# Patient Record
Sex: FEMALE | Race: AMERICAN INDIAN OR ALASKA NATIVE
[De-identification: names, ages, dates, MRNs, and addresses within clinical notes are randomized per-mention and may not be internally consistent; named-entity substitution may affect disease eponyms.]

---

## 2017-04-19 NOTE — CR
DATE OF SERVICE:  04/18/17

CLINICAL DATA:  abdominal pain



SUPINE AND UPRIGHT ABDOMEN:  



There is a moderate amount of gas and stool present throughout the colon.  



No evidence of obstruction or ileus.  No free air. 



There is a 2 mm calcification in the left pelvis which is most likely vascular.
  Distal ureteral calculi should be considered.



The exam is otherwise negative.



[id number]
[facility]

## 2017-04-19 NOTE — ER
HISTORY OF PRESENT ILLLNESS:  The patient presents to the emergency room by 
ambulance.  The

patient is the historian.  Her chief complaint is epigastric pain that developed

approximately 4 hours prior to arrival.  She did have some dry heaves.  In the 
ambulance,

she was given morphine for abdominal discomfort and Zofran for nausea.  The 
patient states

that she has had a history of gallstone in the past.  She has no history of 
peptic ulcer

disease.  She states that she is a smoker, does not do any street drugs, or 
drink alcohol to

any significant degree.  She has not had any alcohol recently.  She gets her 
medical care in

[place] where they are treating her for hypothyroidism and depression.  She is 
on Synthroid

125 mg p.o. daily, Zoloft 50 mg daily, and Remeron 30 mg p.o. daily.

 

REVIEW OF SYSTEMS:

The patient denies headache, fever, chills, cough, wheeze, shortness of breath, 
chest pain,

palpitations, CVA pain, dysuria, joint inflammation, limitations of joint 
motions, skin

rashes, or neurologic complaints.  She does think she could be pregnant.  She 
does admit to

having some constipation.

 

OBJECTIVE:  GENERAL:  A well-developed, well-nourished female.  She is alert 
and oriented

x3.  She appears to be in some mild distress due to epigastric discomfort.  She 
has no

heaves, no dry heaves.  VITAL SIGNS:  Upon admission, show temperature 99.3, 
blood pressure

123/73, pulse is 56, respirations 18, and she is 100% saturated on room air.  
SKIN:

Anicteric.  It is dry with good turgor.  There is no rash.  EYES:  EOMI, PERRLA
, and

anicteric.  ENT:  Throat is clear and moist.  HEART:  Regular rate and rhythm.  
No murmurs,

rubs, or gallops.  No S3, no S4.  LUNGS:  Clear.  BACK:  No CVA or cord 
tenderness.

ABDOMEN:  Soft.  Bowel sounds x4.  No organomegaly.  No rebound.  No discomfort 
except for

epigastric pain, but this seems to be somewhat mild in nature.  NEUROLOGIC:  
Normal.

EXTREMITIES:  No clubbing, cyanosis, or edema.

 

Labs were performed.  EKG from the ambulance showed normal sinus rhythm, no 
evidence of

acute ischemia.  CBC was normal without a shift.  CMP was normal.  UA was 
normal.  Amylase

and lipase were normal.

 

In the emergency room, the patient was given Zantac 50 mg IV and a GI cocktail 
and she did

have some relief with this, but was not totally pain free.  A flat and upright 
was ordered

after her pregnancy test came back negative, the film showed only stool to

be present.  No evidence of perforation, no air/fluid levels and, no gallstones 
noted on her x-ray.

 

ASSESSMENT:  Epigastric pain.

 

PLAN:  The patient will follow up with her physician in the morning.  She was 
given

hydrocodone 5/APAP 325 one p.o. q.4 hours p.r.n., #3, were dispensed.  A 
prescription for

omeprazole 20 mg 1 p.o. daily was written and she can also use over-the-counter 
Zantac

p.r.n. as directed on the label.  The patient will return if she gets worsening 
pain, fever,

vomiting.  The patient was discharged to home, picked up by family members.

 

DIAGNOSES:

1. Epigastric pain.

2. Gastritis.

 

 

KAREN/GONZALO

DD:  04/19/2017 01:05:09

DT:  04/19/2017 04:17:41

Job #:  [id number]

BRAN

## 2017-07-22 NOTE — EDM.PDOC
ED HPI GENERAL MEDICAL PROBLEM





- General


Stated Complaint: Epigastric pain


Time Seen by Provider: 07/22/17 11:05


Source of Information: Reports: Patient


History Limitations: Reports: No Limitations





- History of Present Illness


INITIAL COMMENTS - FREE TEXT/NARRATIVE: 





Patient is a 42 year old woman with a history of abdominal pain caused both by 

gallstones and possibly a stomach ulcer.  She was supposed to get her 

gallbladder removed but she has not done that so far.  She drank two large 

doses of Ny Quil last night and this morning she has had worsening abdominal 

pain to the point she called for the ambulance to come get her and help her 

with the pain.  It feels more like when she was told she possibly had 

gastritis.  No fever or chills or other complaints, no nausea, vomiting or 

diarrhea and no blood in the stool. 


Onset: Today, Sudden


Onset Date: 07/22/17


Onset Time: 09:00


Duration: Hour(s): (2)


Location: Reports: Abdomen (epigastric region)


Quality: Reports: Ache, Burning, Same as Previous Episode, Sharp


Severity: Severe


Improves with: Reports: None


Worsens with: Reports: None


Context: Reports: Other (History of cholelithiasis and gastritis.)


Associated Symptoms: Reports: No Other Symptoms


  ** Abdominal


Pain Score (Numeric/FACES): 9





- Related Data


 Allergies











Allergy/AdvReac Type Severity Reaction Status Date / Time


 


No Known Allergies Allergy   Verified 04/18/17 22:41











Home Meds: 


 Home Meds





Levothyroxine Sodium [Synthroid] 125 mg PO DAILY 12/20/13 [History]


Mirtazapine 30 mg PO DAILY 02/07/15 [History]


Sertraline [Zoloft] 50 mg PO DAILY 02/07/15 [History]











Past Medical History


HEENT History: Reports: Impaired Vision


Respiratory History: Reports: Pneumonia, Recurrent


Gastrointestinal History: Reports: Chronic Constipation, Other (See Below)


Other Gastrointestinal History: hx gall stones


Genitourinary History: Reports: Other (See Below)


Other Genitourinary History: kidney stone Hx


OB/GYN History: Reports: Pregnancy


Musculoskeletal History: Reports: Arthritis, Other (See Below)


Other Musculoskeletal History: hx broken colar bone, arthritis in knees


Neurological History: Reports: Brain Injury, Concussion


Psychiatric History: Reports: Anxiety, Depression


Endocrine/Metabolic History: Reports: Hyperthyroidism





Social & Family History





- Family History


Family Medical History: Noncontributory





- Tobacco Use


Smoking Status *Q: Current Every Day Smoker


Years of Tobacco use: 25


Packs/Tins Daily: 1


Used Tobacco, but Quit: No


Second Hand Smoke Exposure: Yes





- Caffeine Use


Caffeine Use: Reports: Coffee, Soda, Tea





- Alcohol Use


Days Per Week of Alcohol Use: 0





- Recreational Drug Use


Recreational Drug Use: Yes


Drug Use in Last 12 Months: Yes


Recreational Drug Type: Reports: Marijuana/Hashish, Methamphetamine


Recreational Drug Use Frequency: Socially





ED ROS GENERAL





- Review of Systems


Review Of Systems: See Below


Constitutional: Reports: Decreased Appetite


HEENT: Reports: No Symptoms


Respiratory: Reports: No Symptoms


Cardiovascular: Reports: No Symptoms


Endocrine: Reports: No Symptoms


GI/Abdominal: Reports: Abdominal Pain (Epigastric region as per HPI.)


: Reports: No Symptoms


Musculoskeletal: Reports: No Symptoms


Skin: Reports: No Symptoms


Neurological: Reports: No Symptoms


Psychiatric: Reports: No Symptoms


Hematologic/Lymphatic: Reports: No Symptoms


Immunologic: Reports: No Symptoms





ED EXAM, GI/ABD





- Physical Exam


Exam: See Below


Exam Limited By: No Limitations


General Appearance: Alert, WD/WN, No Apparent Distress


Eyes: Bilateral: Normal Appearance, EOMI


Ears: Normal External Exam, Normal Canal, Hearing Grossly Normal, Normal TMs


Nose: Normal Inspection, Normal Mucosa, No Blood


Throat/Mouth: Normal Inspection, Normal Lips, Normal Teeth, Normal Gums, Normal 

Oropharynx, Normal Voice, No Airway Compromise


Head: Atraumatic, Normocephalic


Neck: Normal Inspection, Supple, Non-Tender, Full Range of Motion


Respiratory/Chest: No Respiratory Distress, Lungs Clear, Normal Breath Sounds, 

No Accessory Muscle Use, Chest Non-Tender


Cardiovascular: Normal Peripheral Pulses, Regular Rate, Rhythm, No Edema, No 

Gallop, No JVD, No Murmur, No Rub


GI/Abdominal Exam: Soft, No Organomegaly, No Distention, No Abnormal Bruit, No 

Mass, Tender (Minimal pain on palpation of the epigastric region.  Pain 

described was not commensurate with exam.)


Back Exam: Normal Inspection, Full Range of Motion, NT


Extremities: Normal Inspection, Normal Range of Motion, Non-Tender, Normal 

Capillary Refill, No Pedal Edema


Neurological: Alert, Oriented, CN II-XII Intact, Normal Cognition, Normal Gait, 

Normal Reflexes, No Motor/Sensory Deficits


Psychiatric: Normal Affect, Normal Mood


Skin Exam: Warm, Dry, Intact, Normal Color, No Rash


Lymphatic: No Adenopathy





Course





- Vital Signs


Text/Narrative:: 





Uneventful ED course.  Once she got a liter of fluid, a GI cocktail and 1 mg of 

Dilaudid, her pain had gone from 9/10 to 3/10 and she wanted to go  home on 

some tramadol. She will take Omeprazole 20 mg po q day and Tramadol 50 mg po q 

4 hours and she will follow up with her general physician next week if pain 

returns or worsens.


Last Recorded V/S: 





 Last Vital Signs











Temp  36.7 C   07/22/17 11:50


 


Pulse  68   07/22/17 11:50


 


Resp  16   07/22/17 11:50


 


BP  108/68   07/22/17 11:50


 


Pulse Ox  99   07/22/17 11:50














- Orders/Labs/Meds


Orders: 





 Active Orders 24 hr











 Category Date Time Status


 


 EKG Documentation Completion [RC] ASDIRECTED Care  07/22/17 11:21 Active


 


 Abdomen Pelvis w Cont [CT] Stat Exams  07/22/17 11:21 Taken











Labs: 





 Laboratory Tests











  07/22/17 07/22/17 07/22/17 Range/Units





  10:45 11:40 11:40 


 


WBC   10.1   (4.0-11.0)  K/uL


 


RBC   3.71 L   (3.80-5.80)  M/uL


 


Hgb   10.6 L   (11.5-16.5)  g/dL


 


Hct   32.4 L   (37.0-47.0)  %


 


MCV   87   (76-96)  fL


 


MCH   28.6   (27.0-32.0)  pg


 


MCHC   32.7   (31.0-35.0)  g/dL


 


RDW   15.1   (11.0-16.0)  %


 


Plt Count   366   (150-500)  K/uL


 


MPV   9.3   (6.0-10.0)  fL


 


Neut % (Auto)   73.1 H   (45.0-70.0)  %


 


Lymph % (Auto)   18.8 L   (20.0-40.0)  %


 


Mono % (Auto)   6.6   (3.0-10.0)  %


 


Eos % (Auto)   1.1   (1.0-5.0)  %


 


Baso % (Auto)   0.4   (0.0-0.5)  %


 


Neut # (Auto)   7.39   (2.00-7.50)  K/uL


 


Lymph # (Auto)   1.90   (1.50-4.00)  K/uL


 


Mono # (Auto)   0.67   (0.20-0.80)  K/uL


 


Eos # (Auto)   0.11   (0.04-0.40)  K/uL


 


Baso # (Auto)   0.04   (0.02-0.10)  K/uL


 


Sodium    144  (136-145)  mmol/L


 


Potassium    4.0  (3.5-5.1)  mmol/L


 


Chloride    110 H  ()  mmol/L


 


Carbon Dioxide    24.2  (21.0-32.0)  mmol/L


 


Anion Gap    13.8  (5.0-15.0)  mmol/L


 


BUN    10  (8-26)  mg/dL


 


Creatinine    0.78  (0.55-1.02)  mg/dL


 


Est Cr Clr Drug Dosing    66.80  mL/min


 


Estimated GFR (MDRD)    > 60  (>60)  MLS/MIN


 


BUN/Creatinine Ratio    12.8  (6-25)  


 


Glucose    87  ()  mg/dL


 


Calcium    8.7  (8.5-10.1)  mg/dL


 


Total Bilirubin    0.3  (0.0-1.0)  mg/dL


 


AST    32  (15-37)  U/L


 


ALT    19  (12-78)  U/L


 


Alkaline Phosphatase    86  ()  U/L


 


Troponin I     (0.000-0.060)  ng/mL


 


Total Protein    7.4  (6.4-8.2)  g/dL


 


Albumin    3.6  (3.4-5.0)  g/dL


 


Globulin    3.8  (2.2-4.2)  g/dL


 


Albumin/Globulin Ratio    0.9  (0.8-2.0)  


 


HCG, Qual  Negative    (NEGATIVE)  


 


Urine Color     


 


Urine Appearance     (CLEAR)  


 


Urine pH     (5.0-8.0)  


 


Ur Specific Gravity     (1.003-1.030)  


 


Urine Protein     (NEGATIVE)  mg/dL


 


Urine Glucose (UA)     (NEGATIVE)  mg/dL


 


Urine Ketones     (NEGATIVE)  mg/dL


 


Urine Occult Blood     (NEGATIVE)  


 


Urine Nitrite     (NEGATIVE)  


 


Urine Bilirubin     (NEGATIVE)  


 


Urine Urobilinogen     (0.2-1.0)  E.U./dL


 


Ur Leukocyte Esterase     (NEGATIVE)  


 


Urine RBC     /HPF


 


Urine WBC     /HPF


 


Ur Squamous Epith Cells     /HPF


 


Urine Opiates Screen     (NEGATIVE)  


 


Ur Oxycodone Screen     (NEGATIVE)  


 


Urine Methadone Screen     (NEGATIVE)  


 


U Acetaminophen Screen     (NEGATIVE)  


 


Ur Barbiturates Screen     (NEGATIVE)  


 


Ur Tricyclics Screen     (NEGATIVE)  


 


Ur Phencyclidine Scrn     (NEGATIVE)  


 


Ur Amphetamine Screen     (NEGATIVE)  


 


U Methamphetamines Scrn     (NEGATIVE)  


 


U Benzodiazepines Scrn     (NEGATIVE)  


 


U Cocaine Metab Screen     (NEGATIVE)  


 


U Marijuana (THC) Screen     (NEGATIVE)  














  07/22/17 07/22/17 07/22/17 Range/Units





  11:40 13:04 13:04 


 


WBC     (4.0-11.0)  K/uL


 


RBC     (3.80-5.80)  M/uL


 


Hgb     (11.5-16.5)  g/dL


 


Hct     (37.0-47.0)  %


 


MCV     (76-96)  fL


 


MCH     (27.0-32.0)  pg


 


MCHC     (31.0-35.0)  g/dL


 


RDW     (11.0-16.0)  %


 


Plt Count     (150-500)  K/uL


 


MPV     (6.0-10.0)  fL


 


Neut % (Auto)     (45.0-70.0)  %


 


Lymph % (Auto)     (20.0-40.0)  %


 


Mono % (Auto)     (3.0-10.0)  %


 


Eos % (Auto)     (1.0-5.0)  %


 


Baso % (Auto)     (0.0-0.5)  %


 


Neut # (Auto)     (2.00-7.50)  K/uL


 


Lymph # (Auto)     (1.50-4.00)  K/uL


 


Mono # (Auto)     (0.20-0.80)  K/uL


 


Eos # (Auto)     (0.04-0.40)  K/uL


 


Baso # (Auto)     (0.02-0.10)  K/uL


 


Sodium     (136-145)  mmol/L


 


Potassium     (3.5-5.1)  mmol/L


 


Chloride     ()  mmol/L


 


Carbon Dioxide     (21.0-32.0)  mmol/L


 


Anion Gap     (5.0-15.0)  mmol/L


 


BUN     (8-26)  mg/dL


 


Creatinine     (0.55-1.02)  mg/dL


 


Est Cr Clr Drug Dosing     mL/min


 


Estimated GFR (MDRD)     (>60)  MLS/MIN


 


BUN/Creatinine Ratio     (6-25)  


 


Glucose     ()  mg/dL


 


Calcium     (8.5-10.1)  mg/dL


 


Total Bilirubin     (0.0-1.0)  mg/dL


 


AST     (15-37)  U/L


 


ALT     (12-78)  U/L


 


Alkaline Phosphatase     ()  U/L


 


Troponin I  < 0.017    (0.000-0.060)  ng/mL


 


Total Protein     (6.4-8.2)  g/dL


 


Albumin     (3.4-5.0)  g/dL


 


Globulin     (2.2-4.2)  g/dL


 


Albumin/Globulin Ratio     (0.8-2.0)  


 


HCG, Qual     (NEGATIVE)  


 


Urine Color    Yellow  


 


Urine Appearance    Clear  (CLEAR)  


 


Urine pH    7.0  (5.0-8.0)  


 


Ur Specific Gravity    1.015  (1.003-1.030)  


 


Urine Protein    Negative  (NEGATIVE)  mg/dL


 


Urine Glucose (UA)    Negative  (NEGATIVE)  mg/dL


 


Urine Ketones    Negative  (NEGATIVE)  mg/dL


 


Urine Occult Blood    Trace-lysed H  (NEGATIVE)  


 


Urine Nitrite    Negative  (NEGATIVE)  


 


Urine Bilirubin    Negative  (NEGATIVE)  


 


Urine Urobilinogen    0.2  (0.2-1.0)  E.U./dL


 


Ur Leukocyte Esterase    Negative  (NEGATIVE)  


 


Urine RBC    5-10 H  /HPF


 


Urine WBC    Not seen  /HPF


 


Ur Squamous Epith Cells    Few  /HPF


 


Urine Opiates Screen   Negative   (NEGATIVE)  


 


Ur Oxycodone Screen   Negative   (NEGATIVE)  


 


Urine Methadone Screen   Negative   (NEGATIVE)  


 


U Acetaminophen Screen   Positive H   (NEGATIVE)  


 


Ur Barbiturates Screen   Positive H   (NEGATIVE)  


 


Ur Tricyclics Screen   Negative   (NEGATIVE)  


 


Ur Phencyclidine Scrn   Negative   (NEGATIVE)  


 


Ur Amphetamine Screen   Negative   (NEGATIVE)  


 


U Methamphetamines Scrn   Negative   (NEGATIVE)  


 


U Benzodiazepines Scrn   Negative   (NEGATIVE)  


 


U Cocaine Metab Screen   Negative   (NEGATIVE)  


 


U Marijuana (THC) Screen   Positive H   (NEGATIVE)  











Meds: 





Medications














Discontinued Medications














Generic Name Dose Route Start Last Admin





  Trade Name Freq  PRN Reason Stop Dose Admin


 


Al Hydroxide/Mg Hydroxide  30 ml  07/22/17 12:59  07/22/17 13:07





  Gi Cocktail  PO  07/22/17 13:00  30 ml





  ONETIME ONE   Administration


 


Hydromorphone HCl  0.5 mg  07/22/17 11:25  07/22/17 11:25





  Dilaudid  IVPUSH  07/22/17 11:26  0.5 mg





  ONETIME ONE   Administration


 


Hydromorphone HCl  Confirm  07/22/17 11:31  07/22/17 13:07





  Dilaudid  Administered  07/22/17 11:32  Not Given





  Dose   





  2 mg   





  .ROUTE   





  .STK-MED ONE   


 


Hydromorphone HCl  0.5 mg  07/22/17 13:45  07/22/17 13:45





  Dilaudid  IV  07/22/17 13:46  0.5 mg





  ONETIME ONE   Administration


 


Sodium Chloride  1,000 ml  07/22/17 11:30  07/22/17 11:30





  Sodium Chloride 0.9%  IV   1,000 ml





  ASDIRECTED YONAS   Administration














Departure





- Departure


Time of Disposition: 14:48


Disposition: Home, Self-Care 01


Clinical Impression: 


 Epigastric pain, Cholelithiases, Gastritis, Gastritis








- Discharge Information


Instructions:  Gastritis, Adult, Easy-to-Read


Referrals: 


PCP,None [Primary Care Provider] - 


Care Plan Goals: 


Take omprazole as directed. May take pain medication as needed for pain. Return 

to hospital or clinic if symptoms worsen or persist.





- My Orders


Last 24 Hours: 





My Active Orders





07/22/17 11:21


EKG Documentation Completion [RC] ASDIRECTED 


Abdomen Pelvis w Cont [CT] Stat 














- Assessment/Plan


Last 24 Hours: 





My Active Orders





07/22/17 11:21


EKG Documentation Completion [RC] ASDIRECTED 


Abdomen Pelvis w Cont [CT] Stat

## 2017-07-23 NOTE — CT
DATE OF SERVICE:  7/22/17

CLINICAL DATA:  Abdominal pain



ENHANCED ABDOMEN AND PELVIS CT



Multislice acquisition through the abdomen and pelvis with IV, but without oral 
contrast, was performed. 



Comparison is made to a prior exam dated 12/20/13.



There is a linear density in the left lung base consistent with linear 
atelectasis or fibrosis. The lung bases are otherwise clear. 



There is diffuse fatty infiltration of the liver. No focal hepatic lesions. No 
gallstones. No definite evidence for cholecystitis. There is mild intrahepatic 
biliary duct dilatation. The common bile duct is also mildly dilated measuring 
9 mm. A distal obstructing lesion cannot be excluded. 



The spleen appears normal. The pancreas appears normal. The right and left 
adrenals appear normal. 



The right and left kidneys appear normal. They enhance symmetrically. No 
hydronephrosis or hydroureter. 



The bladder is partially fluid filled; it appears normal.



The appendix is not dilated. No evidence of appendicitis. 



There is fluid within the endometrial cavity of the uterus. There are low-
density lesions in both ovaries consistent with bilateral ovarian cysts. The 
largest is within the left ovary and measures 2.3 cm in diameter. 



There is a very small amount of free fluid noted within the pelvis. 



No free air. No dilated loops of bowel. No adenopathy.  No aortic aneurysm or 
dissection. 



There is a small umbilical hernia containing fat. 



There is apparent diffuse gastric wall thickening most likely related to 
nondistention. Gastritis should be considered.



IMPRESSIONS

1.  Diffuse fatty infiltration of the liver. 

2.  Mild intrahepatic biliary duct dilatation. The common bile duct is dilated 
measuring 9 mm. A distal obstructing process should be considered. Endoscopic 
retrograde cholangiopancreatogram should be considered. 

3.  Apparent diffuse gastric wall thickening. This is probably related to 
nondistention. Gastritis should be considered. 

4.  Bilateral ovarian cysts. The largest is on the left and measures 2.3 cm. 
Followup ultrasound is recommended to confirm resolution. 



[id number]
[facility]

## 2018-07-05 ENCOUNTER — HOSPITAL ENCOUNTER (EMERGENCY)
Dept: HOSPITAL 60 - LB.ED | Age: 43
Discharge: HOME | End: 2018-07-05
Payer: MEDICAID

## 2018-07-05 VITALS — DIASTOLIC BLOOD PRESSURE: 59 MMHG | SYSTOLIC BLOOD PRESSURE: 101 MMHG

## 2018-07-05 DIAGNOSIS — L03.116: ICD-10-CM

## 2018-07-05 DIAGNOSIS — K82.9: Primary | ICD-10-CM

## 2018-07-05 PROCEDURE — 82150 ASSAY OF AMYLASE: CPT

## 2018-07-05 PROCEDURE — 81001 URINALYSIS AUTO W/SCOPE: CPT

## 2018-07-05 PROCEDURE — 74150 CT ABDOMEN W/O CONTRAST: CPT

## 2018-07-05 PROCEDURE — 80053 COMPREHEN METABOLIC PANEL: CPT

## 2018-07-05 PROCEDURE — 36415 COLL VENOUS BLD VENIPUNCTURE: CPT

## 2018-07-05 PROCEDURE — 99284 EMERGENCY DEPT VISIT MOD MDM: CPT

## 2018-07-05 PROCEDURE — 85025 COMPLETE CBC W/AUTO DIFF WBC: CPT

## 2018-07-05 PROCEDURE — 83690 ASSAY OF LIPASE: CPT

## 2018-07-05 NOTE — ER
HISTORY OF PRESENT ILLNESS:  A 43-year-old lady, who comes in with upper abdominal pain.

She is pointing to the midline epigastric area.  She comes in by ambulance.  She stated that

it started this morning after she had some coffee.  She denies any falls or injuries.  She

states she has had history of a similar pain over the last couple of years intermittently,

and has been told that she needs her gallbladder out.  She has not done this.  The patient

did vomit once today and is nauseated.  She also wants to have a tender spot on the inside

of the left thigh checked.  She states it hurts and she has noticed a rash there.

 

OBJECTIVE:  GENERAL APPEARANCE:  The patient is awake and alert.  She is uncomfortable from

the abdominal pain.  She is lying in the fetal position.

VITAL SIGNS:  Reviewed.  She is afebrile.  Pulse is 82, blood pressure 101/59.

ABDOMEN:  Abdomen is soft.  There is definite tenderness with even light touch in the upper

right and midline areas.  Bowel sounds are present and active.

SKIN:  Warm and dry.

LUNGS:  Clear.

EXTREMITIES:  Examining the patient's inner left proximal thigh reveals redness and mild

swelling.  There is induration in the area that is about 3 to 4 cm in diameter, but there is

no fluctuance or pustule noted.

 

INITIAL TREATMENT PLAN:  Zofran was given 4 mg sublingually followed by a GI cocktail 30 mL

p.o.  This did not change the patient's pain at all, but did help with the nausea.  After

this, an IV was started.  She was given Toradol 15 mg IV which brought her pain down to a

5/10.  We then gave her morphine 2 mg IV that brought her pain down to 3.

 

LABORATORY DATA:  Include a CBC showing a normal white count.  Metabolic panel is

unremarkable.  Liver enzymes are elevated.  AST is 264, ALT 82, and alkaline phosphatase at

182.  UA shows some subtle abnormalities.  Amylase and lipase are normal.  Abdominal CT is

also normal.

 

DIAGNOSES:

1. Gallbladder attack.

2. Cellulitis with an early stage boil involving the left inner thigh.

 

TREATMENT PLAN:  The patient was given 3 more mg of morphine here in the emergency room and

she stated this brought her pain down to which she calls slight.  She is still lying in a

semi-fetal position, but appears more relaxed.  The patient will be given 1 L of normal

saline per bolus, and before she is discharged, we will give her another 15 mg of Toradol

IV.  I will give the patient Norco tablets to take as needed, giving 12 tablets, and I will

put her on Augmentin for her cellulitis/boil.  I advised the patient to use hot packs to

this area as well.  She is to schedule an ultrasound as soon as she can.  Nursing staff will

assist with this and I want the patient to be followed up within the next day or two if her

symptoms are

not improving, if her pain is well controlled, she is to utilize a bland diet and follow up

with the ultrasound.  The patient has no further questions.

 

 

TAZ/GONZALO

DD:  07/05/2018 12:41:43

DT:  07/05/2018 12:55:01

Job #:  [id number]

## 2018-07-06 NOTE — CT
CLINICAL DATA:  Abd pain, possible gallstone.



UNENHANCED ABDOMEN CT, 5 JULY 2018:



Multislice acquisitions through the abdomen without IV or oral contrast was 
performed.  



Comparison is made to a prior enhanced Abdomen and Pelvic CT dated 22 July 2017.



Breathing motion artifact significantly degrades image quality. 



The lung bases are clear.  



The unenhanced liver appears normal.  The gallbladder appears normal.  



The spleen appears normal.  The pancreas appears grossly normal.  The right and 
left adrenals appear grossly normal.  



The right and left kidneys appear grossly normal.  No nephrocalcinosis or 
nephrolithiasis.  No hydronephrosis or hydroureter.  



There is a small umbilical hernia containing fat. 



No free air.  No free fluid.  No dilated loops of bowel.  No adenopathy.  No 
aortic aneurysm.  



IMPRESSION:  Suboptimal grade study due to significant breathing motion 
artifact.  No acute abnormalities noted.  Followup imaging is recommended, if 
clinically indicated.  



Job:  [id number]
[facility]

## 2019-11-03 NOTE — ER
HISTORY OF PRESENT ILLNESS:  A 44-year-old lady here with complaints of pain involving the

right side of her head, her ear hurts, and that radiates up to the temple area.  She states

this started on Thursday.  She has noticed a small rash starting to develop as well in this

area.  The patient has not been running a fever.  She states the pain is excruciating at

times.  She has not been vomiting and she denies any falls or injuries.

 

OBJECTIVE:  GENERAL APPEARANCE:  The patient is awake and alert.  She is mildly

uncomfortable from pain.  VITAL SIGNS:  Reviewed.  She is afebrile.  Blood pressure 133/88,

O2 sats of 100%.  PHYSICAL EXAM:  Examining the right side of the patient's face reveals a

small erythematous rash along the hairline just in front of the ear.  The patient has pain

with even light touch of the external ear just below it and in front of it radiating up to

the temple area along the hairline basically.  The ear canal is normal and the TM is normal

in appearance.

 

LABS AND X-RAY:  CBC is normal and the sedimentation rate is normal at 8.

 

DIAGNOSIS:  Herpes zoster.

 

TREATMENT PLAN:  The patient was initially given a Norco tablet 10/325 in the emergency room

after the labs were available confirming the diagnosis.  We gave her prednisone 40 mg p.o.

She will be also started on Neurontin 100 mg capsules one capsule t.i.d. and Valtrex 1 g

t.i.d. for 1 week.  Prednisone, we will give her a small bottle from the ER which has 15

tablets in it.  She is to take 4 tablets a day.  I gave the patient a slip to be off work

today.  I do want her to go home and rest.  We talked about passing chickenpox on to someone

who has not had a chickenpox infection.  I want the patient to follow up in the clinic in a

couple of days for a recheck or sooner of course if her pain is not fairly well controlled

with these measures.  We also talked briefly about the possibility of postherpetic

neuralgia.  The patient has no further questions.

 

 

CRS/MODL

DD:  11/03/2019 14:48:46

DT:  11/03/2019 22:15:14

Job #:  [id number]
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
